# Patient Record
Sex: MALE | Race: BLACK OR AFRICAN AMERICAN | NOT HISPANIC OR LATINO | URBAN - METROPOLITAN AREA
[De-identification: names, ages, dates, MRNs, and addresses within clinical notes are randomized per-mention and may not be internally consistent; named-entity substitution may affect disease eponyms.]

---

## 2023-07-07 ENCOUNTER — HOSPITAL ENCOUNTER (OUTPATIENT)
Dept: DATA CONVERSION | Facility: HOSPITAL | Age: 83
End: 2023-07-07
Attending: INTERNAL MEDICINE | Admitting: INTERNAL MEDICINE

## 2023-07-07 DIAGNOSIS — E11.9 TYPE 2 DIABETES MELLITUS WITHOUT COMPLICATIONS (MULTI): ICD-10-CM

## 2023-07-07 DIAGNOSIS — E55.9 VITAMIN D DEFICIENCY, UNSPECIFIED: ICD-10-CM

## 2023-07-07 DIAGNOSIS — K57.30 DIVERTICULOSIS OF LARGE INTESTINE WITHOUT PERFORATION OR ABSCESS WITHOUT BLEEDING: ICD-10-CM

## 2023-07-07 DIAGNOSIS — I10 ESSENTIAL (PRIMARY) HYPERTENSION: ICD-10-CM

## 2023-07-07 DIAGNOSIS — E66.9 OBESITY, UNSPECIFIED: ICD-10-CM

## 2023-07-07 DIAGNOSIS — Z87.891 PERSONAL HISTORY OF NICOTINE DEPENDENCE: ICD-10-CM

## 2023-07-07 DIAGNOSIS — Z79.82 LONG TERM (CURRENT) USE OF ASPIRIN: ICD-10-CM

## 2023-07-07 DIAGNOSIS — K64.9 UNSPECIFIED HEMORRHOIDS: ICD-10-CM

## 2023-07-07 DIAGNOSIS — Z86.73 PERSONAL HISTORY OF TRANSIENT ISCHEMIC ATTACK (TIA), AND CEREBRAL INFARCTION WITHOUT RESIDUAL DEFICITS: ICD-10-CM

## 2023-07-07 DIAGNOSIS — Z79.02 LONG TERM (CURRENT) USE OF ANTITHROMBOTICS/ANTIPLATELETS: ICD-10-CM

## 2023-07-07 DIAGNOSIS — Z12.11 ENCOUNTER FOR SCREENING FOR MALIGNANT NEOPLASM OF COLON: ICD-10-CM

## 2023-07-07 DIAGNOSIS — E78.5 HYPERLIPIDEMIA, UNSPECIFIED: ICD-10-CM

## 2023-07-07 DIAGNOSIS — Z85.038 PERSONAL HISTORY OF OTHER MALIGNANT NEOPLASM OF LARGE INTESTINE: ICD-10-CM

## 2023-09-29 VITALS — WEIGHT: 200.4 LBS | BODY MASS INDEX: 33.39 KG/M2 | HEIGHT: 65 IN

## 2024-11-05 ENCOUNTER — HOSPITAL ENCOUNTER (OUTPATIENT)
Dept: RADIOLOGY | Facility: HOSPITAL | Age: 84
Discharge: HOME | End: 2024-11-05
Payer: MEDICAID

## 2024-11-05 ENCOUNTER — OFFICE VISIT (OUTPATIENT)
Dept: ORTHOPEDIC SURGERY | Facility: HOSPITAL | Age: 84
End: 2024-11-05
Payer: MEDICAID

## 2024-11-05 DIAGNOSIS — M25.561 CHRONIC PAIN OF RIGHT KNEE: ICD-10-CM

## 2024-11-05 DIAGNOSIS — G89.29 CHRONIC PAIN OF RIGHT KNEE: ICD-10-CM

## 2024-11-05 DIAGNOSIS — M17.11 PRIMARY OSTEOARTHRITIS OF RIGHT KNEE: Primary | ICD-10-CM

## 2024-11-05 PROCEDURE — 73560 X-RAY EXAM OF KNEE 1 OR 2: CPT | Mod: RIGHT SIDE | Performed by: STUDENT IN AN ORGANIZED HEALTH CARE EDUCATION/TRAINING PROGRAM

## 2024-11-05 PROCEDURE — 1036F TOBACCO NON-USER: CPT | Performed by: ORTHOPAEDIC SURGERY

## 2024-11-05 PROCEDURE — 1159F MED LIST DOCD IN RCRD: CPT | Performed by: ORTHOPAEDIC SURGERY

## 2024-11-05 PROCEDURE — 99203 OFFICE O/P NEW LOW 30 MIN: CPT | Performed by: ORTHOPAEDIC SURGERY

## 2024-11-05 PROCEDURE — 99213 OFFICE O/P EST LOW 20 MIN: CPT | Performed by: ORTHOPAEDIC SURGERY

## 2024-11-05 PROCEDURE — 73560 X-RAY EXAM OF KNEE 1 OR 2: CPT | Mod: RT

## 2024-11-05 ASSESSMENT — PAIN SCALES - GENERAL: PAINLEVEL_OUTOF10: 0 - NO PAIN

## 2024-11-05 ASSESSMENT — PAIN - FUNCTIONAL ASSESSMENT: PAIN_FUNCTIONAL_ASSESSMENT: 0-10

## 2024-11-05 NOTE — PROGRESS NOTES
ORTHOPAEDIC HISTORY AND PHYSICAL    History Of Present Illness  Orthopaedic Problems/Injuries:  Imer Hopkins is a 84 y.o. male presenting with right leg weakness.  Patient have history of stroke about 2 years ago with good recovery.  Patient reports that he has stiff right knee that is baseline.  However he noted over the last few weeks he has had some intermittent weakness below the knee.  He is able to ambulate.  He reports that he does not have any pain at this time.  He is able to ambulate independently most of the time.  He denies any symptoms consistent with his previous stroke.  No bowel or bladder problem.  He denies any instability.  He denies any injuries of recent falls.      Review of Systems: 12 point ROS negative unless stated in HPI    Past Medical History  He has a past medical history of Aftercare following joint replacement surgery (04/23/2018), Aftercare following joint replacement surgery (04/23/2018), Aftercare following joint replacement surgery (05/16/2018), Carpal tunnel syndrome, unspecified upper limb (04/23/2018), Carpal tunnel syndrome, unspecified upper limb (04/23/2018), Essential (primary) hypertension (12/25/2013), Essential (primary) hypertension (04/23/2018), Essential (primary) hypertension (04/23/2018), Essential (primary) hypertension (05/16/2018), Hemiplegia and hemiparesis following unspecified cerebrovascular disease affecting unspecified side (Multi) (04/23/2018), Hemiplegia and hemiparesis following unspecified cerebrovascular disease affecting unspecified side (Multi) (04/23/2018), Hemiplegia and hemiparesis following unspecified cerebrovascular disease affecting unspecified side (Multi) (05/16/2018), Hyperlipidemia, unspecified (04/23/2018), Hyperlipidemia, unspecified (04/23/2018), Hyperlipidemia, unspecified (05/16/2018), Hypermetropia, unspecified eye (02/10/2015), Hypermetropia, unspecified eye (02/10/2015), Hypermetropia, unspecified eye (02/10/2015), Meibomian gland  dysfunction of unspecified eye, unspecified eyelid (05/16/2018), Osteoarthritis of knee, unspecified (04/23/2018), Osteoarthritis of knee, unspecified (10/20/2020), Other cerebral infarction due to occlusion or stenosis of small artery (04/23/2018), Other cerebral infarction due to occlusion or stenosis of small artery (04/23/2018), Other cerebral infarction due to occlusion or stenosis of small artery (05/16/2018), Other conditions influencing health status (05/16/2018), Other secondary cataract, right eye (05/09/2016), Pain in unspecified knee (04/23/2018), Pain in unspecified knee (10/20/2020), Personal history of other endocrine, nutritional and metabolic disease, Prediabetes, Type 2 diabetes mellitus without complications (Multi) (04/23/2018), Type 2 diabetes mellitus without complications (Multi) (04/23/2018), Type 2 diabetes mellitus without complications (Multi) (05/16/2018), Unspecified astigmatism, unspecified eye (05/16/2018), Unspecified osteoarthritis, unspecified site (04/23/2018), and Unspecified osteoarthritis, unspecified site (10/20/2020).    Surgical History  He has a past surgical history that includes Total knee arthroplasty (01/13/2015); Cataract extraction (02/10/2015); Foot surgery (02/10/2015); MR angio head wo IV contrast (8/2/2021); MR angio neck wo IV contrast (8/2/2021); MR angio head wo IV contrast (7/8/2016); and MR angio neck wo IV contrast (7/8/2016).     Social History  He reports that he has never smoked. He has never used smokeless tobacco. He reports that he does not drink alcohol and does not use drugs.    Family History  No family history on file.     Allergies  Patient has no known allergies.    Review of Systems     Physical Exam  Gen: The patient is alert and oriented ×3, is in no acute distress, and appear their stated age and weight.    Psychiatric: Mood and affect are appropriate.    Eyes: Sclera are white, and pupils are round and symmetric.    ENT: Mucous membranes are  moist.     Neck: Supple. Thyroid is midline.    Respiratory: Respirations are nonlabored, chest rise is symmetric.    Cardiac: Rate is regular by palpation of distal pulses.     Abdomen: Nondistended.    Integument: No obvious cutaneous lesions are noted. No signs of lymphangitis. No signs of systemic edema.    Physical examination of the right knee revealed no effusion in the knee and there were no skin abnormalities or lymphangitis. The knee demonstrated varus alignment. There was no tenderness about the knee, thigh or calf. There was tenderness at the medial joint space. There was discomfort with patellofemoral compression. The knee came to within 5 degrees of full extension. Straight leg raising was without lag, flexion was to 120 degrees and ligamentous stability was full. The neurovascular examination extremity was intact.The neurological exam including motor and sensory exam was performed. The vascular examination including palpation of pulses and capillary refill of the foot was performed and determined to be intact.  Patient has 5 out of 5 knee extension, quad strength and also ankle dorsiflexion        Relevant Results     Patient has severe right knee osteoarthritis joint space narrowing.  Assessment/Plan   Patient presents today with severe right knee osteoarthritis however he does not have pain.  I discussed with him clinically there is no evidence of weakness however if he is having intermittent weakness given his history of stroke is always a concern for TIA.  I recommend that he sees his primary care physician for evaluation.  For his arthritis if he has pain I discussed with him corticosteroid injection would be an option.  He may perform physical therapy also.  He will come back and see me if his knee becomes painful.

## 2025-01-22 ENCOUNTER — APPOINTMENT (OUTPATIENT)
Dept: OPHTHALMOLOGY | Facility: CLINIC | Age: 85
End: 2025-01-22
Payer: MEDICAID

## 2025-01-31 ENCOUNTER — APPOINTMENT (OUTPATIENT)
Dept: OPHTHALMOLOGY | Facility: CLINIC | Age: 85
End: 2025-01-31
Payer: MEDICAID

## 2025-01-31 DIAGNOSIS — Z96.1 PSEUDOPHAKIA, RIGHT EYE: ICD-10-CM

## 2025-01-31 DIAGNOSIS — H26.8 MATURE CATARACT: ICD-10-CM

## 2025-01-31 DIAGNOSIS — H46.9 OPTIC NEUROPATHY, LEFT: ICD-10-CM

## 2025-01-31 DIAGNOSIS — H40.003 GLAUCOMA SUSPECT OF BOTH EYES: Primary | ICD-10-CM

## 2025-01-31 PROCEDURE — 92004 COMPRE OPH EXAM NEW PT 1/>: CPT | Performed by: OPHTHALMOLOGY

## 2025-01-31 PROCEDURE — 92134 CPTRZ OPH DX IMG PST SGM RTA: CPT | Performed by: OPHTHALMOLOGY

## 2025-01-31 RX ORDER — AMLODIPINE BESYLATE 5 MG/1
5 TABLET ORAL DAILY
COMMUNITY

## 2025-01-31 RX ORDER — AMMONIUM LACTATE 12 G/100G
CREAM TOPICAL
COMMUNITY
Start: 2025-01-07

## 2025-01-31 RX ORDER — ATORVASTATIN CALCIUM 80 MG/1
80 TABLET, FILM COATED ORAL DAILY
COMMUNITY

## 2025-01-31 RX ORDER — LOSARTAN POTASSIUM 100 MG/1
1 TABLET ORAL
COMMUNITY
Start: 2024-10-23

## 2025-01-31 RX ORDER — CHOLECALCIFEROL (VITAMIN D3) 50 MCG
1 TABLET ORAL
COMMUNITY
Start: 2024-10-26

## 2025-01-31 ASSESSMENT — REFRACTION_MANIFEST
OS_SPHERE: PLANO
OS_ADD: +3.00
OD_SPHERE: PLANO
OD_ADD: +3.00
METHOD_AUTOREFRACTION: 1
OD_SPHERE: +0.50

## 2025-01-31 ASSESSMENT — VISUAL ACUITY
OS_SC: NLP
OD_SC: 20/25
METHOD: SNELLEN - LINEAR

## 2025-01-31 ASSESSMENT — SLIT LAMP EXAM - LIDS
COMMENTS: GOOD POSITION
COMMENTS: GOOD POSITION

## 2025-01-31 ASSESSMENT — CONF VISUAL FIELD
OD_INFERIOR_NASAL_RESTRICTION: 0
OD_INFERIOR_TEMPORAL_RESTRICTION: 0
OD_SUPERIOR_NASAL_RESTRICTION: 0
OD_NORMAL: 1
OD_SUPERIOR_TEMPORAL_RESTRICTION: 0

## 2025-01-31 ASSESSMENT — EXTERNAL EXAM - RIGHT EYE: OD_EXAM: NORMAL

## 2025-01-31 ASSESSMENT — TONOMETRY
OD_IOP_MMHG: 12
IOP_METHOD: GOLDMANN APPLANATION
OS_IOP_MMHG: 12

## 2025-01-31 ASSESSMENT — CUP TO DISC RATIO
OS_RATIO: NO VIEW
OD_RATIO: .4

## 2025-01-31 ASSESSMENT — EXTERNAL EXAM - LEFT EYE: OS_EXAM: NORMAL

## 2025-01-31 NOTE — PROGRESS NOTES
Assessment/Plan   Diagnoses and all orders for this visit:  Glaucoma suspect of both eyes  -     OCT, Retina - OU - Both Eyes  Low normal IOP, oct and nerve stable observe  Optic neuropathy, left  Pseudophakia right  Mature cataract left  Glasses prescription given to patient today   -Followup in 1 year or as needed for symptoms (RSVP: redness, sensitivity to light, vision loss, pain)

## 2026-02-03 ENCOUNTER — APPOINTMENT (OUTPATIENT)
Dept: OPHTHALMOLOGY | Facility: CLINIC | Age: 86
End: 2026-02-03
Payer: MEDICAID